# Patient Record
Sex: FEMALE | Race: WHITE | Employment: OTHER | URBAN - METROPOLITAN AREA
[De-identification: names, ages, dates, MRNs, and addresses within clinical notes are randomized per-mention and may not be internally consistent; named-entity substitution may affect disease eponyms.]

---

## 2018-05-14 ENCOUNTER — HOSPITAL ENCOUNTER (OUTPATIENT)
Dept: GENERAL RADIOLOGY | Age: 63
Discharge: HOME | End: 2018-05-16
Payer: COMMERCIAL

## 2018-05-14 DIAGNOSIS — Z12.31 VISIT FOR SCREENING MAMMOGRAM: ICD-10-CM

## 2018-05-14 PROCEDURE — 77063 BREAST TOMOSYNTHESIS BI: CPT

## 2023-01-09 ENCOUNTER — HOSPITAL ENCOUNTER (OUTPATIENT)
Dept: GENERAL RADIOLOGY | Age: 68
Discharge: HOME | End: 2023-01-11

## 2023-01-09 VITALS — WEIGHT: 160 LBS | BODY MASS INDEX: 25.11 KG/M2 | HEIGHT: 67 IN

## 2023-01-09 DIAGNOSIS — Z12.31 VISIT FOR SCREENING MAMMOGRAM: ICD-10-CM

## 2023-01-09 PROCEDURE — 77067 SCR MAMMO BI INCL CAD: CPT

## 2024-01-22 ENCOUNTER — HOSPITAL ENCOUNTER (OUTPATIENT)
Dept: GENERAL RADIOLOGY | Age: 69
Discharge: HOME | End: 2024-01-24
Payer: MEDICARE

## 2024-01-22 VITALS — BODY MASS INDEX: 25.11 KG/M2 | WEIGHT: 160 LBS | HEIGHT: 67 IN

## 2024-01-22 DIAGNOSIS — Z12.31 SCREENING MAMMOGRAM FOR BREAST CANCER: ICD-10-CM

## 2024-01-22 PROCEDURE — 77063 BREAST TOMOSYNTHESIS BI: CPT

## 2024-01-25 ENCOUNTER — CLINICAL DOCUMENTATION (OUTPATIENT)
Dept: GENERAL RADIOLOGY | Age: 69
End: 2024-01-25

## 2024-01-25 NOTE — PROGRESS NOTES
Mammogram clinical report provided to patient. Report sent to Dr. Aida Morales as per patient's request.

## 2025-01-30 ENCOUNTER — HOSPITAL ENCOUNTER (OUTPATIENT)
Dept: GENERAL RADIOLOGY | Age: 70
Discharge: HOME | End: 2025-02-01
Payer: MEDICARE

## 2025-01-30 VITALS — WEIGHT: 165 LBS | BODY MASS INDEX: 25.9 KG/M2 | HEIGHT: 67 IN

## 2025-01-30 DIAGNOSIS — Z12.31 ENCOUNTER FOR SCREENING MAMMOGRAM FOR MALIGNANT NEOPLASM OF BREAST: ICD-10-CM

## 2025-01-30 PROCEDURE — 77063 BREAST TOMOSYNTHESIS BI: CPT

## 2025-02-03 ENCOUNTER — CLINICAL DOCUMENTATION (OUTPATIENT)
Dept: GENERAL RADIOLOGY | Age: 70
End: 2025-02-03

## 2025-02-03 NOTE — PROGRESS NOTES
Patient viewed clinical report for mammogram on My Chart.  Report sent  to Dr. Aida Morales per patient request on Authorization to Release the results of a mammogram form. Electronically signed by Hoda Torres RN, BSN on 2/3/2025 at 10:00 AM

## 2025-02-04 ENCOUNTER — OFFICE VISIT (OUTPATIENT)
Dept: GASTROENTEROLOGY | Facility: CLINIC | Age: 70
End: 2025-02-04
Payer: MEDICARE

## 2025-02-04 VITALS
TEMPERATURE: 97.9 F | DIASTOLIC BLOOD PRESSURE: 88 MMHG | HEIGHT: 67 IN | WEIGHT: 169 LBS | HEART RATE: 67 BPM | BODY MASS INDEX: 26.53 KG/M2 | SYSTOLIC BLOOD PRESSURE: 137 MMHG

## 2025-02-04 DIAGNOSIS — Z85.038 HISTORY OF COLON CANCER, STAGE I: Primary | ICD-10-CM

## 2025-02-04 PROCEDURE — 99212 OFFICE O/P EST SF 10 MIN: CPT | Performed by: INTERNAL MEDICINE

## 2025-02-04 PROCEDURE — 1159F MED LIST DOCD IN RCRD: CPT | Performed by: INTERNAL MEDICINE

## 2025-02-04 PROCEDURE — 1036F TOBACCO NON-USER: CPT | Performed by: INTERNAL MEDICINE

## 2025-02-04 PROCEDURE — 99202 OFFICE O/P NEW SF 15 MIN: CPT | Performed by: INTERNAL MEDICINE

## 2025-02-04 PROCEDURE — 3008F BODY MASS INDEX DOCD: CPT | Performed by: INTERNAL MEDICINE

## 2025-02-04 PROCEDURE — 1160F RVW MEDS BY RX/DR IN RCRD: CPT | Performed by: INTERNAL MEDICINE

## 2025-02-04 RX ORDER — LEVOTHYROXINE SODIUM 75 UG/1
75 TABLET ORAL DAILY
COMMUNITY

## 2025-02-04 RX ORDER — SODIUM, POTASSIUM,MAG SULFATES 17.5-3.13G
0.51 SOLUTION, RECONSTITUTED, ORAL ORAL SEE ADMIN INSTRUCTIONS
Qty: 354 ML | Refills: 0 | Status: SHIPPED | OUTPATIENT
Start: 2025-02-04

## 2025-02-04 NOTE — PROGRESS NOTES
Subjective   Patient ID: Cammy Johnston is a 69 y.o. female.    Referred by Dr. Дмитрий Alegria regarding surveillance for colon cancer    Tumor Board   PMH: Diverticulitis, primary adenocarcinoma of ascending colon  Staging: Stage 1: G2 moderate differentiation with 3 mm submucosal invasion removed piecemeal fashion  Presenter / Specialty: Osman Galvez/general surgery  Primary reason for presentation: Recommendations for patient, colonoscopy with small polyp removal which resulted in adenomatous colonic polyps  Prognostic Indicators: Yes  Discussion / recommendations: Awaiting genetic testing results for decision regarding right hemicolectomy vs. total colectomy     Prior bowel prep with Gatorade too sweet and switched to pure water with MiraLax  Discussed need for electrolytes    Son needs screening     CT chest planned - needs to have abdomen and pelvis added     No symptoms   Follow up colonoscopy in August saw no tumor; site had healed and tattoo placed           Review of Systems    Objective   Physical Exam  Constitutional:       Appearance: Normal appearance. She is normal weight.   Cardiovascular:      Rate and Rhythm: Normal rate and regular rhythm.   Pulmonary:      Effort: Pulmonary effort is normal.   Abdominal:      Palpations: Abdomen is soft.   Neurological:      Mental Status: She is alert.   Psychiatric:         Mood and Affect: Mood normal.         Behavior: Behavior normal.         Thought Content: Thought content normal.         Judgment: Judgment normal.         Assessment/Plan   Diagnoses and all orders for this visit:  History of colon cancer, stage I  -     Colonoscopy Screening; High Risk Patient; Future  -     CEA; Future  -     sodium,potassium,mag sulfates (Suprep) 17.5-3.13-1.6 gram solution; Take 1 bottle by mouth see administration instructions.    She prefers to have imaging at [facility] - orders placed

## 2025-02-05 LAB
CEA SERPL-MCNC: <2 NG/ML
CREAT SERPL-MCNC: 0.63 MG/DL (ref 0.5–1.05)
EGFRCR SERPLBLD CKD-EPI 2021: 96 ML/MIN/1.73M2

## 2025-03-03 ENCOUNTER — APPOINTMENT (OUTPATIENT)
Dept: RADIOLOGY | Facility: HOSPITAL | Age: 70
End: 2025-03-03
Payer: MEDICARE

## 2025-03-06 ENCOUNTER — HOSPITAL ENCOUNTER (OUTPATIENT)
Dept: RADIOLOGY | Facility: HOSPITAL | Age: 70
Discharge: HOME | End: 2025-03-06
Payer: MEDICARE

## 2025-03-06 DIAGNOSIS — Z85.038 HISTORY OF COLON CANCER, STAGE I: ICD-10-CM

## 2025-03-06 PROCEDURE — 2550000001 HC RX 255 CONTRASTS: Performed by: INTERNAL MEDICINE

## 2025-03-06 PROCEDURE — 71250 CT THORAX DX C-: CPT

## 2025-03-06 PROCEDURE — 74177 CT ABD & PELVIS W/CONTRAST: CPT

## 2025-03-06 RX ADMIN — IOHEXOL 75 ML: 350 INJECTION, SOLUTION INTRAVENOUS at 09:28

## 2025-07-22 DIAGNOSIS — Z85.038 HISTORY OF COLON CANCER, STAGE I: Primary | ICD-10-CM

## 2025-07-22 RX ORDER — POLYETHYLENE GLYCOL 3350, SODIUM SULFATE ANHYDROUS, SODIUM BICARBONATE, SODIUM CHLORIDE, POTASSIUM CHLORIDE 236; 22.74; 6.74; 5.86; 2.97 G/4L; G/4L; G/4L; G/4L; G/4L
4 POWDER, FOR SOLUTION ORAL ONCE
Qty: 4000 ML | Refills: 0 | Status: SHIPPED | OUTPATIENT
Start: 2025-07-22 | End: 2025-07-22

## 2025-07-22 RX ORDER — SODIUM, POTASSIUM,MAG SULFATES 17.5-3.13G
0.51 SOLUTION, RECONSTITUTED, ORAL ORAL SEE ADMIN INSTRUCTIONS
Qty: 354 ML | Refills: 0 | Status: SHIPPED | OUTPATIENT
Start: 2025-07-22 | End: 2025-07-22

## 2025-08-04 ENCOUNTER — HOSPITAL ENCOUNTER (OUTPATIENT)
Dept: GASTROENTEROLOGY | Facility: HOSPITAL | Age: 70
Discharge: HOME | End: 2025-08-04
Payer: MEDICARE

## 2025-08-04 ENCOUNTER — ANESTHESIA (OUTPATIENT)
Dept: GASTROENTEROLOGY | Facility: HOSPITAL | Age: 70
End: 2025-08-04
Payer: MEDICARE

## 2025-08-04 ENCOUNTER — ANESTHESIA EVENT (OUTPATIENT)
Dept: GASTROENTEROLOGY | Facility: HOSPITAL | Age: 70
End: 2025-08-04
Payer: MEDICARE

## 2025-08-04 VITALS
HEART RATE: 64 BPM | OXYGEN SATURATION: 100 % | TEMPERATURE: 97.2 F | WEIGHT: 156.09 LBS | BODY MASS INDEX: 24.5 KG/M2 | DIASTOLIC BLOOD PRESSURE: 75 MMHG | HEIGHT: 67 IN | RESPIRATION RATE: 16 BRPM | SYSTOLIC BLOOD PRESSURE: 114 MMHG

## 2025-08-04 DIAGNOSIS — Z85.038 HISTORY OF COLON CANCER, STAGE I: ICD-10-CM

## 2025-08-04 PROCEDURE — 7100000009 HC PHASE TWO TIME - INITIAL BASE CHARGE

## 2025-08-04 PROCEDURE — 7100000010 HC PHASE TWO TIME - EACH INCREMENTAL 1 MINUTE

## 2025-08-04 PROCEDURE — A45385 PR COLONOSCOPY,REMV LESN,SNARE: Performed by: NURSE ANESTHETIST, CERTIFIED REGISTERED

## 2025-08-04 PROCEDURE — 3700000002 HC GENERAL ANESTHESIA TIME - EACH INCREMENTAL 1 MINUTE

## 2025-08-04 PROCEDURE — 3700000001 HC GENERAL ANESTHESIA TIME - INITIAL BASE CHARGE

## 2025-08-04 PROCEDURE — 2500000004 HC RX 250 GENERAL PHARMACY W/ HCPCS (ALT 636 FOR OP/ED): Performed by: NURSE ANESTHETIST, CERTIFIED REGISTERED

## 2025-08-04 PROCEDURE — 45385 COLONOSCOPY W/LESION REMOVAL: CPT | Performed by: INTERNAL MEDICINE

## 2025-08-04 PROCEDURE — A45385 PR COLONOSCOPY,REMV LESN,SNARE: Performed by: ANESTHESIOLOGY

## 2025-08-04 RX ORDER — PROPOFOL 10 MG/ML
INJECTION, EMULSION INTRAVENOUS AS NEEDED
Status: DISCONTINUED | OUTPATIENT
Start: 2025-08-04 | End: 2025-08-04

## 2025-08-04 RX ADMIN — PROPOFOL 100 MCG/KG/MIN: 10 INJECTION, EMULSION INTRAVENOUS at 12:40

## 2025-08-04 RX ADMIN — PROPOFOL 2 MG: 10 INJECTION, EMULSION INTRAVENOUS at 12:39

## 2025-08-04 SDOH — HEALTH STABILITY: MENTAL HEALTH: CURRENT SMOKER: 0

## 2025-08-04 ASSESSMENT — PAIN SCALES - GENERAL
PAINLEVEL_OUTOF10: 0 - NO PAIN

## 2025-08-04 ASSESSMENT — COLUMBIA-SUICIDE SEVERITY RATING SCALE - C-SSRS
1. IN THE PAST MONTH, HAVE YOU WISHED YOU WERE DEAD OR WISHED YOU COULD GO TO SLEEP AND NOT WAKE UP?: NO
2. HAVE YOU ACTUALLY HAD ANY THOUGHTS OF KILLING YOURSELF?: NO
6. HAVE YOU EVER DONE ANYTHING, STARTED TO DO ANYTHING, OR PREPARED TO DO ANYTHING TO END YOUR LIFE?: NO

## 2025-08-04 ASSESSMENT — PAIN - FUNCTIONAL ASSESSMENT
PAIN_FUNCTIONAL_ASSESSMENT: 0-10

## 2025-08-04 ASSESSMENT — ENCOUNTER SYMPTOMS: CONSTITUTIONAL NEGATIVE: 1

## 2025-08-04 NOTE — DISCHARGE INSTRUCTIONS

## 2025-08-04 NOTE — ANESTHESIA PREPROCEDURE EVALUATION
"Patient: Cammy Johnston    Procedure Information       Date/Time: 08/04/25 1210    Scheduled providers: Mehdi Wood DO; Agustina Toledo MD; AL Cunningham; Alfred Stewart RN; Shanae Mcleod RN    Procedure: COLONOSCOPY    Location: [facility]            Relevant Problems   No relevant active problems       Clinical information reviewed:   Tobacco  Allergies  Meds   Med Hx  Surg Hx  OB Status  Fam Hx  Soc   Hx         Medical History[1]   Surgical History[2]  Social History[3]   Current Outpatient Medications   Medication Instructions    calcium carb/vit D3/minerals (CALCIUM-VITAMIN D ORAL) Take by mouth.    levothyroxine (SYNTHROID, LEVOXYL) 75 mcg, Daily    MAGNESIUM ORAL Take by mouth.    TURMERIC ORAL Take by mouth.      RX Allergies[4]     Chemistry    Lab Results   Component Value Date/Time    CREATININE 0.63 02/04/2025 1023    No results found for: \"CALCIUM\", \"ALKPHOS\", \"AST\", \"ALT\", \"BILITOT\"       No results found for: \"HGBA1C\"  No results found for: \"WBC\", \"HGB\", \"HCT\", \"PLT\", \"ABO\", \"LABRH\", \"ABSCRN\"  No results found for: \"PROTIME\", \"PTT\", \"INR\"  No results found for this or any previous visit (from the past 4464 hours).   No results found for this or any previous visit from the past 1095 days.        Visit Vitals  /75   Pulse 73   Temp 36.3 °C (97.3 °F) (Temporal)   Resp 17   Ht 1.702 m (5' 7\")   Wt 70.8 kg (156 lb 1.4 oz)   SpO2 98%   BMI 24.45 kg/m²   OB Status Postmenopausal   Smoking Status Never   BSA 1.83 m²     NPO/Void Status  Carbohydrate Drink Given Prior to Surgery? : N  Date of Last Liquid: 08/04/25  Time of Last Liquid: 0630  Date of Last Solid: 08/02/25  Time of Last Solid: 1900  Last Intake Type: Clear fluids  Time of Last Void: 0900        Physical Exam    Airway  Mallampati: I  TM distance: >3 FB  Neck ROM: full  Mouth opening: 3 or more finger widths     Cardiovascular   Rhythm: regular     Dental - normal exam     Pulmonary Breath sounds clear to " auscultation     Abdominal            Anesthesia Plan    History of general anesthesia?: yes  History of complications of general anesthesia?: no    ASA 2     MAC     The patient is not a current smoker.    intravenous induction   Anesthetic plan and risks discussed with patient.    Plan discussed with CRNA and CAA.            [1]   Past Medical History:  Diagnosis Date    Colon polyp     History of kidney stones     Hypothyroidism     Insomnia    [2]   Past Surgical History:  Procedure Laterality Date    COLONOSCOPY      TUBAL LIGATION N/A    [3]   Social History  Tobacco Use    Smoking status: Never    Smokeless tobacco: Never   Substance Use Topics    Alcohol use: Never    Drug use: Never   [4] No Known Allergies

## 2025-08-04 NOTE — H&P
"History Of Present Illness  Cammy Johnston is a 69 y.o. female presenting with a history of a malignant colon polyp in the ascending colon here for surveillance.   See outpatient note for pathologic details.     Past Medical History  Medical History[1]  Surgical History  Surgical History[2]  Social History  She reports that she has never smoked. She has never used smokeless tobacco. She reports that she does not drink alcohol and does not use drugs.    Family History  Family History[3]     Allergies  Allergies[4]  Review of Systems   Constitutional: Negative.         Physical Exam  Constitutional:       Appearance: Normal appearance. She is normal weight.     Cardiovascular:      Rate and Rhythm: Normal rate and regular rhythm.   Pulmonary:      Effort: Pulmonary effort is normal.      Breath sounds: Normal breath sounds.   Abdominal:      Palpations: Abdomen is soft.     Neurological:      Mental Status: She is alert.     Psychiatric:         Mood and Affect: Mood normal.         Behavior: Behavior normal.         Thought Content: Thought content normal.         Judgment: Judgment normal.          Last Recorded Vitals  Blood pressure 131/75, pulse 73, temperature 36.3 °C (97.3 °F), temperature source Temporal, resp. rate 17, height 1.702 m (5' 7\"), weight 70.8 kg (156 lb 1.4 oz), SpO2 98%.    Assessment/Plan   Colonoscopy for colon cancer surveillance with attention to the ascending colon     Mehdi Wood DO       [1]   Past Medical History:  Diagnosis Date    Colon polyp     History of kidney stones     Hypothyroidism     Insomnia    [2]   Past Surgical History:  Procedure Laterality Date    COLONOSCOPY      TUBAL LIGATION N/A    [3]   Family History  Problem Relation Name Age of Onset    Alcohol abuse Father  0 - 9   [4] No Known Allergies    "

## 2025-08-05 ASSESSMENT — PAIN SCALES - GENERAL
PAIN_LEVEL: 0
PAINLEVEL_OUTOF10: 0 - NO PAIN

## 2025-08-05 NOTE — ANESTHESIA POSTPROCEDURE EVALUATION
Patient: Cammy Johnston    Procedure Summary       Date: 08/04/25 Room / Location: [facility] [place]    Anesthesia Start: 1236 Anesthesia Stop: 1307    Procedure: COLONOSCOPY Diagnosis: History of colon cancer, stage I    Scheduled Providers: Mehdi Wood DO; Agustina Toledo MD; AL Cunningham; Alfred Stewart RN; Shanae Mcleod RN Responsible Provider: Agustina Toledo MD    Anesthesia Type: MAC ASA Status: 2            Anesthesia Type: MAC    Vitals Value Taken Time   /75 08/04/25 13:36   Temp 36.2 °C (97.2 °F) 08/04/25 13:06   Pulse 64 08/04/25 13:36   Resp 16 08/04/25 13:36   SpO2 100 % 08/04/25 13:36       Anesthesia Post Evaluation    Patient location during evaluation: PACU  Patient participation: complete - patient participated  Level of consciousness: awake  Pain score: 0  Pain management: adequate  Airway patency: patent  Cardiovascular status: acceptable  Respiratory status: acceptable  Hydration status: acceptable  Postoperative Nausea and Vomiting: none        No notable events documented.

## 2025-08-12 LAB
LABORATORY COMMENT REPORT: NORMAL
PATH REPORT.FINAL DX SPEC: NORMAL
PATH REPORT.GROSS SPEC: NORMAL
PATH REPORT.RELEVANT HX SPEC: NORMAL
PATH REPORT.TOTAL CANCER: NORMAL

## 2025-08-21 ENCOUNTER — TELEPHONE (OUTPATIENT)
Dept: GASTROENTEROLOGY | Facility: HOSPITAL | Age: 70
End: 2025-08-21
Payer: MEDICARE

## 2025-08-25 ENCOUNTER — APPOINTMENT (OUTPATIENT)
Dept: GASTROENTEROLOGY | Facility: HOSPITAL | Age: 70
End: 2025-08-25
Payer: MEDICARE